# Patient Record
Sex: FEMALE | URBAN - METROPOLITAN AREA
[De-identification: names, ages, dates, MRNs, and addresses within clinical notes are randomized per-mention and may not be internally consistent; named-entity substitution may affect disease eponyms.]

---

## 2020-02-10 ENCOUNTER — OFFICE VISIT (OUTPATIENT)
Dept: URBAN - METROPOLITAN AREA CLINIC 76 | Facility: CLINIC | Age: 85
End: 2020-02-10
Payer: MEDICARE

## 2020-02-10 DIAGNOSIS — H04.123 DRY EYE SYNDROME OF BILATERAL LACRIMAL GLANDS: ICD-10-CM

## 2020-02-10 PROCEDURE — 92014 COMPRE OPH EXAM EST PT 1/>: CPT | Performed by: OPHTHALMOLOGY

## 2020-02-10 PROCEDURE — 92020 GONIOSCOPY: CPT | Performed by: OPHTHALMOLOGY

## 2020-02-10 RX ORDER — DORZOLAMIDE HYDROCHLORIDE AND TIMOLOL MALEATE 20; 5 MG/ML; MG/ML
SOLUTION/ DROPS OPHTHALMIC
Qty: 15 | Refills: 0 | Status: INACTIVE
Start: 2020-02-10 | End: 2021-03-04

## 2020-02-10 ASSESSMENT — INTRAOCULAR PRESSURE
OD: 18
OS: 19

## 2020-02-10 NOTE — IMPRESSION/PLAN
Impression: Bilateral nonexudative age-related macular degeneration, intermediate dry stage: H35.3132. Plan: Will continue to observe condition and or symptoms.  Use of vitamins may reduce progression of ARMD.

## 2020-02-10 NOTE — IMPRESSION/PLAN
Impression: Primary open-angle glaucoma, bilateral, moderate stage: J63.7029. OU. 
 IOP's too high for ON appearance. VF and OCT reviewed. Plan: Discussed diagnosis in detail with patient. Continue Dorzolamide/Timolol BID OU, Lumigan QHS OU. PT on MMT. Discussed additional treatment options. Glaucoma Sx is not recommend at this time. Recommend SLT OU. Discussed r/b/a of SLT. Pt would like to proceed.   Schedule SLT  OU

## 2020-03-10 ENCOUNTER — PROCEDURE (OUTPATIENT)
Dept: URBAN - METROPOLITAN AREA CLINIC 76 | Facility: CLINIC | Age: 85
End: 2020-03-10
Payer: MEDICARE

## 2020-03-10 DIAGNOSIS — H40.1432 CAPSLR GLAUCOMA W/PSEUDXF LENS, BILATERAL, MODERATE STAGE: ICD-10-CM

## 2020-03-10 RX ORDER — KETOROLAC TROMETHAMINE 5 MG/ML
0.5 % SOLUTION OPHTHALMIC
Qty: 5 | Refills: 0 | Status: INACTIVE
Start: 2020-03-10 | End: 2021-03-04

## 2020-03-10 ASSESSMENT — INTRAOCULAR PRESSURE
OD: 18
OS: 17
OS: 18
OD: 22

## 2020-03-20 ENCOUNTER — POST-OPERATIVE VISIT (OUTPATIENT)
Dept: URBAN - METROPOLITAN AREA CLINIC 71 | Facility: CLINIC | Age: 85
End: 2020-03-20

## 2020-03-20 PROCEDURE — 99024 POSTOP FOLLOW-UP VISIT: CPT | Performed by: OPTOMETRIST

## 2020-03-20 ASSESSMENT — INTRAOCULAR PRESSURE
OD: 18
OS: 16

## 2020-03-20 NOTE — IMPRESSION/PLAN
Impression: S/P SLT OU - 8 Days. Primary open-angle glaucoma, bilateral, moderate stage  H40.1132. Plan: Continue Edgar/Mandeep bid OU and Lumigan qhs OU. Discussed dry eyes and treatment with AT's.

## 2020-04-21 ENCOUNTER — OFFICE VISIT (OUTPATIENT)
Dept: URBAN - METROPOLITAN AREA CLINIC 76 | Facility: CLINIC | Age: 85
End: 2020-04-21
Payer: MEDICARE

## 2020-04-21 DIAGNOSIS — H40.1132 PRIMARY OPEN-ANGLE GLAUCOMA, BILATERAL, MODERATE STAGE: Primary | ICD-10-CM

## 2020-04-21 PROCEDURE — 99213 OFFICE O/P EST LOW 20 MIN: CPT | Performed by: OPHTHALMOLOGY

## 2020-04-21 ASSESSMENT — INTRAOCULAR PRESSURE
OS: 16
OD: 17

## 2020-04-21 NOTE — IMPRESSION/PLAN
Impression: Primary open-angle glaucoma, bilateral, moderate stage: M02.5389. OU. 
S/P SLT OU (03/10/2020) IOP OU improved w/ SLT Plan: Continue to monitor. Continue Dorzolamide/Timolol BID OU, Lumigan QHS OU. PT on MMT. Discussed additional treatment options. Glaucoma Sx is not recommend at this time.

## 2020-07-21 ENCOUNTER — OFFICE VISIT (OUTPATIENT)
Dept: URBAN - METROPOLITAN AREA CLINIC 76 | Facility: CLINIC | Age: 85
End: 2020-07-21
Payer: MEDICARE

## 2020-07-21 PROCEDURE — 99213 OFFICE O/P EST LOW 20 MIN: CPT | Performed by: OPHTHALMOLOGY

## 2020-07-21 ASSESSMENT — INTRAOCULAR PRESSURE
OS: 18
OD: 20

## 2020-07-21 NOTE — IMPRESSION/PLAN
Impression: Primary open-angle glaucoma, bilateral, moderate stage: W60.6996. OU. 
S/P SLT OU (03/10/2020) IOP OU controlled on current regimen. No changes needed. Plan: Continue to monitor. Continue Dorzolamide/Timolol BID OU, Lumigan QHS OU. PT on MMT. Discussed additional treatment options. Glaucoma Sx is not recommend at this time.  
recommend 6 month appt due to COVID -19

## 2021-03-04 ENCOUNTER — OFFICE VISIT (OUTPATIENT)
Dept: URBAN - METROPOLITAN AREA CLINIC 76 | Facility: CLINIC | Age: 86
End: 2021-03-04
Payer: MEDICARE

## 2021-03-04 DIAGNOSIS — Z96.1 PRESENCE OF INTRAOCULAR LENS: ICD-10-CM

## 2021-03-04 PROCEDURE — 99213 OFFICE O/P EST LOW 20 MIN: CPT | Performed by: OPTOMETRIST

## 2021-03-04 RX ORDER — NETARSUDIL AND LATANOPROST OPHTHALMIC SOLUTION, 0.02%/0.005% .2; .05 MG/ML; MG/ML
SOLUTION/ DROPS OPHTHALMIC; TOPICAL
Qty: 5 | Refills: 0 | Status: INACTIVE
Start: 2021-03-04 | End: 2021-04-27

## 2021-03-04 ASSESSMENT — INTRAOCULAR PRESSURE
OS: 17
OD: 20

## 2021-03-25 ENCOUNTER — OFFICE VISIT (OUTPATIENT)
Dept: URBAN - METROPOLITAN AREA CLINIC 76 | Facility: CLINIC | Age: 86
End: 2021-03-25
Payer: MEDICARE

## 2021-03-25 DIAGNOSIS — H52.4 PRESBYOPIA: ICD-10-CM

## 2021-03-25 DIAGNOSIS — H35.3132 BILATERAL NONEXUDATIVE AGE-RELATED MACULAR DEGENERATION, INTERMEDIATE DRY STAGE: ICD-10-CM

## 2021-03-25 PROCEDURE — 99213 OFFICE O/P EST LOW 20 MIN: CPT | Performed by: OPTOMETRIST

## 2021-03-25 RX ORDER — TIMOLOL 5.12 MG/ML
0.5 % SOLUTION/ DROPS OPHTHALMIC
Qty: 7.5 | Refills: 3 | Status: INACTIVE
Start: 2021-03-25 | End: 2021-03-29

## 2021-03-25 ASSESSMENT — INTRAOCULAR PRESSURE
OS: 16
OD: 18

## 2021-03-25 NOTE — IMPRESSION/PLAN
Impression: Primary open-angle glaucoma, bilateral, moderate stage: Z00.4779. OU. 
S/P SLT OU (03/10/2020) IOP OU WNL/Stable OU. Restart Rocklatan QHS OU and Timolol BID OU. No changes needed. OCT  Mild NFL thinning super>infer OD, Mild NFL thinning infer>super OS Plan: Continue to monitor. Re-start Rocklatan QHS OU and Timolol BID OU. PT on MMT. Recommend using OTC tears 3-4 X OU for irritation. D/C Lumigan OU. RTC 1 month IOP check. Dr. Julia Mora on medical leave. Will continue to monitor with Dr. Julia Mora.

## 2021-04-27 ENCOUNTER — OFFICE VISIT (OUTPATIENT)
Dept: URBAN - METROPOLITAN AREA CLINIC 76 | Facility: CLINIC | Age: 86
End: 2021-04-27
Payer: MEDICARE

## 2021-04-27 PROCEDURE — 99214 OFFICE O/P EST MOD 30 MIN: CPT | Performed by: OPTOMETRIST

## 2021-04-27 RX ORDER — NETARSUDIL AND LATANOPROST OPHTHALMIC SOLUTION, 0.02%/0.005% .2; .05 MG/ML; MG/ML
SOLUTION/ DROPS OPHTHALMIC; TOPICAL
Qty: 5 | Refills: 0 | Status: INACTIVE
Start: 2021-04-27 | End: 2021-06-24

## 2021-04-27 RX ORDER — DORZOLAMIDE HYDROCHLORIDE AND TIMOLOL MALEATE 20; 5 MG/ML; MG/ML
SOLUTION/ DROPS OPHTHALMIC
Qty: 5 | Refills: 0 | Status: INACTIVE
Start: 2021-04-27 | End: 2021-07-12

## 2021-04-27 ASSESSMENT — KERATOMETRY
OD: 45.00
OS: 44.75

## 2021-04-27 ASSESSMENT — INTRAOCULAR PRESSURE
OD: 21
OS: 18

## 2021-04-27 ASSESSMENT — VISUAL ACUITY
OD: 20/40
OS: 20/40

## 2021-04-27 NOTE — IMPRESSION/PLAN
Impression: Primary open-angle glaucoma, bilateral, moderate stage: C20.6877. OU. 
S/P SLT OU (03/10/2020) IOP OU elevated OD>OS. Pt taken off of Cosopt by Dr. Negrito Muñoz due to irritation. Plan: Continue to monitor. Continue Rocklatan QHS OU and D/C Timolol BID OU and restart Cosopt BID OU. Recommend using OTC tears 3-4 X OU for irritation. Recommend consult with Dr. Rob Morgan for eval and possible repeat SLT.

## 2021-06-24 ENCOUNTER — OFFICE VISIT (OUTPATIENT)
Dept: URBAN - METROPOLITAN AREA CLINIC 76 | Facility: CLINIC | Age: 86
End: 2021-06-24
Payer: MEDICARE

## 2021-06-24 PROCEDURE — 99214 OFFICE O/P EST MOD 30 MIN: CPT | Performed by: OPHTHALMOLOGY

## 2021-06-24 RX ORDER — BIMATOPROST 0.1 MG/ML
0.01 % SOLUTION/ DROPS OPHTHALMIC
Qty: 7.5 | Refills: 3 | Status: ACTIVE
Start: 2021-06-24

## 2021-06-24 ASSESSMENT — INTRAOCULAR PRESSURE
OS: 16
OD: 20

## 2021-06-24 NOTE — IMPRESSION/PLAN
Impression: Primary open-angle glaucoma, bilateral, moderate stage: S31.4323. S/P SLT OU (03/10/2020) IOP good OU today. Reviewed VF/OCT. Unable to tolerate Rocklatan. Plan: Discussed condition. Continue Dorz/Mandeep BID OU.  d/c Rocklatan. Re-start Lumigan QHS OU. Discussed that it is common for Glaucoma drops to cause redness, dryness and irritation. Recommend using AT's 3-4 times per day. Discussed there is a limit of Glaucoma medications available.

## 2021-09-23 ENCOUNTER — OFFICE VISIT (OUTPATIENT)
Dept: URBAN - METROPOLITAN AREA CLINIC 76 | Facility: CLINIC | Age: 86
End: 2021-09-23
Payer: MEDICARE

## 2021-09-23 PROCEDURE — 99214 OFFICE O/P EST MOD 30 MIN: CPT | Performed by: OPHTHALMOLOGY

## 2021-09-23 ASSESSMENT — INTRAOCULAR PRESSURE
OS: 14
OD: 16

## 2021-09-23 NOTE — IMPRESSION/PLAN
Impression: Primary open-angle glaucoma, bilateral, moderate stage: M36.5928. S/P SLT OU (03/10/2020) IOP good OS, improved OD today. Unable to tolerate Rocklatan. Pt last saw Dr. Prince Carson June 2021. Plan: Discussed condition. Continue Dorz/Mandeep BID OU, Lumigan QHS OU. Discussed that it is common for Glaucoma drops to cause redness, dryness and irritation. Recommend using AT's 3-4 times per day. Discussed there is a limit of Glaucoma medications available. Consider SLT in future. Hold off on VF for now.

## 2022-01-11 ENCOUNTER — OFFICE VISIT (OUTPATIENT)
Dept: URBAN - METROPOLITAN AREA CLINIC 76 | Facility: CLINIC | Age: 87
End: 2022-01-11
Payer: MEDICARE

## 2022-01-11 PROCEDURE — 99214 OFFICE O/P EST MOD 30 MIN: CPT | Performed by: OPHTHALMOLOGY

## 2022-01-11 ASSESSMENT — INTRAOCULAR PRESSURE
OS: 16
OD: 18

## 2022-01-11 NOTE — IMPRESSION/PLAN
Impression: Dry eye syndrome of bilateral lacrimal glands: H04.123. Plan: Explained condition does not have a cure and will need artificial tears, Systane or Refresh, 3-4 times per day for maintenance.

## 2022-01-11 NOTE — IMPRESSION/PLAN
Impression: Primary open-angle glaucoma, bilateral, moderate stage: H98.5725. S/P SLT OU (03/10/2020) IOP slightly higher OU today, missed drops this morning. Unable to tolerate Rocklatan. Pt last saw Dr. Rere Elizabeth June 2021. Plan: Discussed condition. Continue Dorz/Mandeep BID OU, Lumigan QHS OU. Emphasized compliance. Consider SLT in future. Hold off on VF for now, will monitor w/ annual DE/RNFL OCT.

## 2022-04-19 ENCOUNTER — OFFICE VISIT (OUTPATIENT)
Dept: URBAN - METROPOLITAN AREA CLINIC 76 | Facility: CLINIC | Age: 87
End: 2022-04-19
Payer: MEDICARE

## 2022-04-19 DIAGNOSIS — Z96.1 PRESENCE OF INTRAOCULAR LENS: ICD-10-CM

## 2022-04-19 DIAGNOSIS — H40.1132 PRIMARY OPEN-ANGLE GLAUCOMA, BILATERAL, MODERATE STAGE: Primary | ICD-10-CM

## 2022-04-19 PROCEDURE — 92133 CPTRZD OPH DX IMG PST SGM ON: CPT | Performed by: OPHTHALMOLOGY

## 2022-04-19 PROCEDURE — 92014 COMPRE OPH EXAM EST PT 1/>: CPT | Performed by: OPHTHALMOLOGY

## 2022-04-19 ASSESSMENT — INTRAOCULAR PRESSURE
OS: 15
OD: 14

## 2022-04-19 NOTE — IMPRESSION/PLAN
Impression: Primary open-angle glaucoma, bilateral, moderate stage: L93.0029. S/P SLT OU (03/10/2020) IOP good OU today. Unable to tolerate Rocklatan. Pt last saw Dr. Logan Martin June 2021. OCT stable compared to last. Plan: Discussed condition. Continue Dorz/Mandeep BID OU, Lumigan QHS OU. Emphasized compliance. Consider SLT in future. Hold off on VF for now, will monitor w/ annual DE/RNFL OCT (4/2023). OK to switch to q6 mos visits.

## 2022-10-20 ENCOUNTER — OFFICE VISIT (OUTPATIENT)
Dept: URBAN - METROPOLITAN AREA CLINIC 76 | Facility: CLINIC | Age: 87
End: 2022-10-20
Payer: MEDICARE

## 2022-10-20 DIAGNOSIS — Z96.1 PRESENCE OF INTRAOCULAR LENS: ICD-10-CM

## 2022-10-20 DIAGNOSIS — H40.1132 PRIMARY OPEN-ANGLE GLAUCOMA, BILATERAL, MODERATE STAGE: Primary | ICD-10-CM

## 2022-10-20 DIAGNOSIS — H04.123 DRY EYE SYNDROME OF BILATERAL LACRIMAL GLANDS: ICD-10-CM

## 2022-10-20 PROCEDURE — 99214 OFFICE O/P EST MOD 30 MIN: CPT | Performed by: OPHTHALMOLOGY

## 2022-10-20 ASSESSMENT — INTRAOCULAR PRESSURE
OS: 14
OD: 16

## 2022-10-20 NOTE — IMPRESSION/PLAN
Impression: Primary open-angle glaucoma, bilateral, moderate stage: E00.4176. S/P SLT OU (03/10/2020) IOP good OU today. Unable to tolerate Rocklatan. Pt last saw Dr. Arletta Essex June 2021. Plan: Discussed condition. Continue Dorz/Mandeep BID OU, Lumigan QHS OU. Emphasized compliance. Consider SLT in future. Hold off on VF for now, will monitor w/ annual DE/RNFL OCT (4/2023). OK to switch to q6 mos visits.